# Patient Record
Sex: FEMALE | Race: BLACK OR AFRICAN AMERICAN | NOT HISPANIC OR LATINO | Employment: STUDENT | URBAN - METROPOLITAN AREA
[De-identification: names, ages, dates, MRNs, and addresses within clinical notes are randomized per-mention and may not be internally consistent; named-entity substitution may affect disease eponyms.]

---

## 2017-06-01 ENCOUNTER — TELEPHONE (OUTPATIENT)
Dept: FAMILY MEDICINE | Facility: CLINIC | Age: 22
End: 2017-06-01

## 2017-06-01 ENCOUNTER — HOSPITAL ENCOUNTER (OUTPATIENT)
Dept: RADIOLOGY | Facility: HOSPITAL | Age: 22
Discharge: HOME | End: 2017-06-01
Attending: FAMILY MEDICINE
Payer: OTHER GOVERNMENT

## 2017-06-01 ENCOUNTER — OFFICE VISIT (OUTPATIENT)
Dept: FAMILY MEDICINE | Facility: CLINIC | Age: 22
End: 2017-06-01
Payer: OTHER GOVERNMENT

## 2017-06-01 VITALS
DIASTOLIC BLOOD PRESSURE: 80 MMHG | HEART RATE: 70 BPM | SYSTOLIC BLOOD PRESSURE: 104 MMHG | TEMPERATURE: 99 F | WEIGHT: 157.94 LBS | OXYGEN SATURATION: 99 % | BODY MASS INDEX: 26.31 KG/M2 | HEIGHT: 65 IN

## 2017-06-01 DIAGNOSIS — M79.676 GREAT TOE PAIN, UNSPECIFIED LATERALITY: ICD-10-CM

## 2017-06-01 DIAGNOSIS — M79.676 GREAT TOE PAIN, UNSPECIFIED LATERALITY: Primary | ICD-10-CM

## 2017-06-01 PROCEDURE — 73630 X-RAY EXAM OF FOOT: CPT | Mod: 26,RT,, | Performed by: RADIOLOGY

## 2017-06-01 PROCEDURE — 73630 X-RAY EXAM OF FOOT: CPT | Mod: TC,PO,RT

## 2017-06-01 PROCEDURE — 99213 OFFICE O/P EST LOW 20 MIN: CPT | Mod: PBBFAC,PO | Performed by: FAMILY MEDICINE

## 2017-06-01 PROCEDURE — 99213 OFFICE O/P EST LOW 20 MIN: CPT | Mod: S$PBB,,, | Performed by: FAMILY MEDICINE

## 2017-06-01 PROCEDURE — 99999 PR PBB SHADOW E&M-EST. PATIENT-LVL III: CPT | Mod: PBBFAC,,, | Performed by: FAMILY MEDICINE

## 2017-06-01 NOTE — TELEPHONE ENCOUNTER
Spoke with pt. Attempted to schedule with Dr. Fan and nothing was available until 6/13 and pt wanted to see another provider sooner. Pt scheduled.

## 2017-06-01 NOTE — PROGRESS NOTES
Subjective:       Patient ID: Dawna Cartagena is a 21 y.o. female.    Chief Complaint: Toe Pain (pt is c/o pain to her right great toe x7 months. ) and Ankle Pain (was doing PT for sprain but had to stop bc of toe pain. )    HPI     Mrs. Cartagena is a 21 y.o. F with a PMH significant for shin splints that presents for a Right great toe injury and ankle sprain.  She [place] her right great [place] on [place]16. Since then the pain has remained in the distal joint line. She characterizes the pain as 5/10 when walking and has tried Tylenol, Ibuprofen and ice packs with out relief. 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 She started doing physical therapy for her ankle but had to stop due to the toe pain. She was advised to reduce activity and to keep the foot elevated with ice and ibuprofen     Review of Systems   Constitutional: Positive for activity change (has had to stop playing basketball and running due to foot pain). Negative for fatigue and unexpected weight change.   HENT: Negative for congestion, facial swelling, rhinorrhea, sore throat and trouble swallowing.    Eyes: Negative for photophobia and visual disturbance.   Respiratory: Negative for cough, chest tightness, shortness of breath and wheezing.    Cardiovascular: Negative for chest pain and leg swelling.   Gastrointestinal: Negative for abdominal pain, diarrhea, nausea and vomiting.   Musculoskeletal: Positive for arthralgias (Left)DIP of great joint and Lateral ankle and gait problem. Negative for joint swelling.   Skin: Negative for color change, rash and wound.   Neurological: Negative for weakness, numbness and headaches.       Objective:      Physical Exam   Constitutional: She is oriented to person, place, and time. She appears well-developed and well-nourished. No distress.   HENT:   Head: Normocephalic and atraumatic.   Right Ear: External ear normal.   Left Ear: External ear normal.   Mouth/Throat:  Oropharynx is clear and moist. No oropharyngeal exudate.   Eyes: Conjunctivae are normal. Pupils are equal, round, and reactive to light.   Neck: Neck supple. No thyromegaly present.   Cardiovascular: Normal rate, regular rhythm, normal heart sounds and intact distal pulses.    No murmur heard.  Pulses:       Dorsalis pedis pulses are 2+ on the right side, and 2+ on the left side.        Posterior tibial pulses are 2+ on the right side, and 2+ on the left side.   Pulmonary/Chest: Effort normal and breath sounds normal. She has no wheezes.   Abdominal: Soft. Bowel sounds are normal.   Musculoskeletal: She exhibits tenderness (Localized to Interphalangeal joint of right great toe and to the Calcaneofibular/inferiorperoneal retinaculum ).        Right foot: There is decreased range of motion (with eversion ). There is no deformity.        Feet:    No obvious ligamentous laxity of the right ankle.    Feet:   Right Foot:   Skin Integrity: Negative for erythema or warmth.   Lymphadenopathy:     She has no cervical adenopathy.   Neurological: She is alert and oriented to person, place, and time. She has normal reflexes. She displays normal reflexes. She exhibits normal muscle tone.   Skin: Skin is warm and dry. Capillary refill takes less than 2 seconds.   Psychiatric: She has a normal mood and affect. Her behavior is normal. Judgment and thought content normal.         Great toe pain, unspecified laterality  -     X-Ray Foot Complete 3 view Right; Future; Expected date: 06/01/2017  - Rest, Ice(no longer than 15-20min), Compression(ace bandage) and Elevation   - Take Ibuprofen/Alieve PRN  Anticipate ortho consult.

## 2017-06-01 NOTE — TELEPHONE ENCOUNTER
----- Message from Nory Stephens sent at 6/1/2017  2:17 PM CDT -----  Contact: Patient  Patient would like her results of xrays. Please call patient at [phone].

## 2017-06-05 ENCOUNTER — OFFICE VISIT (OUTPATIENT)
Dept: ORTHOPEDICS | Facility: CLINIC | Age: 22
End: 2017-06-05
Payer: OTHER GOVERNMENT

## 2017-06-05 VITALS — WEIGHT: 157 LBS | BODY MASS INDEX: 26.16 KG/M2 | HEIGHT: 65 IN

## 2017-06-05 DIAGNOSIS — M25.571 RIGHT ANKLE PAIN, UNSPECIFIED CHRONICITY: Primary | ICD-10-CM

## 2017-06-05 DIAGNOSIS — M79.671 RIGHT FOOT PAIN: ICD-10-CM

## 2017-06-05 PROCEDURE — 99212 OFFICE O/P EST SF 10 MIN: CPT | Mod: PBBFAC,PO | Performed by: ORTHOPAEDIC SURGERY

## 2017-06-05 PROCEDURE — 99999 PR PBB SHADOW E&M-EST. PATIENT-LVL II: CPT | Mod: PBBFAC,,, | Performed by: ORTHOPAEDIC SURGERY

## 2017-06-05 PROCEDURE — 99213 OFFICE O/P EST LOW 20 MIN: CPT | Mod: 25,S$PBB,, | Performed by: ORTHOPAEDIC SURGERY

## 2017-06-05 PROCEDURE — 97760 ORTHOTIC MGMT&TRAING 1ST ENC: CPT | Mod: ,,, | Performed by: ORTHOPAEDIC SURGERY

## 2017-06-05 NOTE — LETTER
June 5, 2017      Sapna Yun MD  [address]  [place] [address]           [facility]  [address] [facility] [address] [place] [address]  Phone: [phone]          Patient: Dawna Cartagena   MR Number: [id number]   YOB: 1995   Date of Visit: 6/5/2017       Dear Dr. Sapna Yun:    Thank you for referring Dawna Cartagena to me for evaluation. Attached you will find relevant portions of my assessment and plan of care.    If you have questions, please do not hesitate to call me. I look forward to following Dawna Cartagena along with you.    Sincerely,    Seb Claros MD    Enclosure  CC:  No Recipients    If you would like to receive this communication electronically, please contact [email or web address] or [phone] to request more information on [employer] access.    For providers and/or their staff who would like to refer a patient to [facility], please contact us through our one-stop-shop provider referral line, [facility], at [phone].    If you feel you have received this communication in error or would no longer like to receive these types of communications, please e-mail [email or web address]

## 2017-06-05 NOTE — PROGRESS NOTES
Ms. Cartagena, 21-year-old , injured her right ankle several   months ago, still bothersome for her in the region of ATFL, 5/10 on the pain   scale, second ankle sprain, first episode responded favorably to therapy.    Exam today shows she is tender at the ATFL.  No instability.  No signs of   infection.  Skin is intact.  Compartments are soft.    X-rays of the foot are negative.    ASSESSMENT:  Ankle sprain with incomplete rehabilitation.    PLAN:  We will get her lace-up ankle brace, get her a prescription for physical   therapy, work diligently at this for the next several weeks.  If she is not   getting better, she will come on back.  I would like to get an MRI, but we would   probably need plain films first.        EMILEE/BERHANE  dd: 06/05/2017 10:07:33 (CDT)  td: 06/05/2017 17:55:33 (CDT)  Doc ID   #[id number]  Job ID #[id number]    CC:     Further History  Aching pain  Worse with activity  Relieved with rest  No other associated symptoms  No other radiation    Further Exam  Alert and oriented  Pleasant  Contralateral limb has appropriate range of motion for age and condition  Contralateral limb has appropriate strength for age and condition  Contralateral limb has appropriate stability  for age and condition  No adenopathy  Pulses are appropriate for current condition  Skin is intact        Chief Complaint    Chief Complaint   Patient presents with    Right Foot - Pain    Right Ankle - Pain       HPI  Dawna Cartagena is a 21 y.o.  female who presents with       Past Medical History  Past Medical History:   Diagnosis Date    Exercise-induced asthma        Past Surgical History  History reviewed. No pertinent surgical history.    Medications  Current Outpatient Prescriptions   Medication Sig    ADVAIR DISKUS 250-50 mcg/dose diskus inhaler     albuterol (PROAIR HFA) 90 mcg/actuation inhaler Inhale 1-2 puffs into the lungs every 6 (six) hours as needed for Shortness of Breath (Use prior to physical activity.).      No current facility-administered medications for this visit.        Allergies  Review of patient's allergies indicates:  No Known Allergies    Family History  Family History   Problem Relation Age of Onset    Asthma Brother     Cancer Maternal Grandmother      breast ca       Social History  Social History     Social History    Marital status: Single     Spouse name: N/A    Number of children: N/A    Years of education: N/A     Occupational History    Not on file.     Social History Main Topics    Smoking status: Never Smoker    Smokeless tobacco: Never Used    Alcohol use No    Drug use: Unknown    Sexual activity: No     Other Topics Concern    Not on file     Social History Narrative    No narrative on file               Review of Systems     Constitutional: Negative    HENT: Negative  Eyes: Negative  Respiratory: Negative  Cardiovascular: Negative  Musculoskeletal: HPI  Skin: Negative  Neurological: Negative  Hematological: Negative  Endocrine: Negative                 Physical Exam    There were no vitals filed for this visit.  Body mass index is 26.13 kg/m².  Physical Examination:     General appearance -  well appearing, and in no distress  Mental status - awake  Neck - supple  Chest -  symmetric air entry  Heart - normal rate   Abdomen - soft      Assessment     1. Right ankle pain, unspecified chronicity    2. Right foot pain          PlanWe performed a custom orthotic/brace fitting, adjusting and training with the patient. The patient demonstrated understanding and proper care. This was performed for 15 minutes.

## 2018-09-17 DIAGNOSIS — J45.990 EXERCISE-INDUCED ASTHMA: ICD-10-CM

## 2018-09-17 RX ORDER — ALBUTEROL SULFATE 90 UG/1
1-2 AEROSOL, METERED RESPIRATORY (INHALATION) EVERY 6 HOURS PRN
Qty: 1 INHALER | Refills: 11 | Status: SHIPPED | OUTPATIENT
Start: 2018-09-17 | End: 2019-09-17